# Patient Record
Sex: MALE | Race: WHITE | Employment: FULL TIME | URBAN - NONMETROPOLITAN AREA
[De-identification: names, ages, dates, MRNs, and addresses within clinical notes are randomized per-mention and may not be internally consistent; named-entity substitution may affect disease eponyms.]

---

## 2022-12-15 ENCOUNTER — OFFICE VISIT (OUTPATIENT)
Dept: PRIMARY CARE CLINIC | Age: 42
End: 2022-12-15

## 2022-12-15 VITALS
RESPIRATION RATE: 18 BRPM | WEIGHT: 248 LBS | SYSTOLIC BLOOD PRESSURE: 116 MMHG | BODY MASS INDEX: 37.59 KG/M2 | HEART RATE: 68 BPM | DIASTOLIC BLOOD PRESSURE: 72 MMHG | OXYGEN SATURATION: 98 % | HEIGHT: 68 IN

## 2022-12-15 DIAGNOSIS — M25.859 FEMORAL ACETABULAR IMPINGEMENT: ICD-10-CM

## 2022-12-15 DIAGNOSIS — M51.16 LUMBAR DISC DISEASE WITH RADICULOPATHY: Primary | ICD-10-CM

## 2022-12-15 PROBLEM — B18.2 CHRONIC HEPATITIS C WITHOUT HEPATIC COMA (HCC): Status: ACTIVE | Noted: 2021-05-26

## 2022-12-15 RX ORDER — NALOXONE HYDROCHLORIDE 4 MG/.1ML
4 SPRAY NASAL
COMMUNITY
Start: 2019-01-02

## 2022-12-15 RX ORDER — METHYLPREDNISOLONE ACETATE 40 MG/ML
60 INJECTION, SUSPENSION INTRA-ARTICULAR; INTRALESIONAL; INTRAMUSCULAR; SOFT TISSUE ONCE
Status: COMPLETED | OUTPATIENT
Start: 2022-12-15 | End: 2022-12-15

## 2022-12-15 RX ORDER — SERTRALINE HYDROCHLORIDE 100 MG/1
100 TABLET, FILM COATED ORAL DAILY
COMMUNITY
Start: 2022-11-20

## 2022-12-15 RX ORDER — METHYLPREDNISOLONE ACETATE 40 MG/ML
60 INJECTION, SUSPENSION INTRA-ARTICULAR; INTRALESIONAL; INTRAMUSCULAR; SOFT TISSUE ONCE
Qty: 1.5 ML | Refills: 0
Start: 2022-12-15 | End: 2022-12-15 | Stop reason: CLARIF

## 2022-12-15 RX ORDER — IBUPROFEN 800 MG/1
800 TABLET ORAL EVERY 8 HOURS PRN
Qty: 90 TABLET | Refills: 0 | Status: SHIPPED | OUTPATIENT
Start: 2022-12-15 | End: 2023-01-14

## 2022-12-15 RX ADMIN — METHYLPREDNISOLONE ACETATE 60 MG: 40 INJECTION, SUSPENSION INTRA-ARTICULAR; INTRALESIONAL; INTRAMUSCULAR; SOFT TISSUE at 13:39

## 2022-12-15 ASSESSMENT — ENCOUNTER SYMPTOMS
BACK PAIN: 1
ABDOMINAL PAIN: 0
BOWEL INCONTINENCE: 0

## 2022-12-15 NOTE — LETTER
[address]  Phone: [phone]  Fax: [phone], APRN - CNP        December 15, 2022     Patient: Zygmunt Graft   YOB: 1980   Date of Visit: 12/15/2022       To Whom It May Concern: It is my medical opinion that Ashly Allan may return to work on 12/15/2022. Work should avoid crawling, continuous kneeling positions. work should include a mixture of sitting, standing walking with occasional bending, twisting or stooping. Lifting is not to exceed 45#. these restrictioons are recommended for 7 days. He is being referred to Specialist for additonal evaluation and recommendations. He will follow up with me 1/3/2022. If you have any questions or concerns, please don't hesitate to call.     Sincerely,        Marie Dixon, JAIME - CNP

## 2022-12-15 NOTE — PROGRESS NOTES
[address]  Dept: [phone]  Dept Fax: [phone]  Loc Appt: [phone]  Loc Fax: [phone]    Rudolph Cabral is a 38 y.o. male who presents today for his medical conditions/complaints as noted below. Chief Complaint   Patient presents with    Back Pain     Lower back pain, has been dealing with it for 2 years now. States his job here is making the pain go to a 7 from a resting of 4           HPI:     38year old male with chronic low back and hip pain requests acute appointment for pain and limitations in performing his work role  New employee with [employer], work role is kneeling, squatting, crawling repetitively doing floor work and he reports this is causing increased pain to his low back, causing him to miss work and impairing his daily activities    He has been seeing AT onsite for this problem and conservative treatment is not improving chronic problem/symptoms  Pt is not insured, has 30 days until insurance coverage is in effect with employer    Has no specific pcp, saw Bradley Nation in past year. He has been worked up for this condition in past year with imaging on file at [place] [id number]. He consented to records release for his appointment today. He is asking for help with exposure and some restrictions to allow his back to rest and for treatment options for his chronic condition. Pain > 2 years, recently employed in past 5 weeks and started having worse pain within a few days working in his new job. 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 The current episode started more than 1 year ago. The problem occurs constantly. The problem has been rapidly worsening since onset. The pain is present in the gluteal, lumbar spine and sacro-iliac.  The quality of the pain is described as aching and shooting. The pain radiates to the right thigh. The pain is at a severity of 6/10. The pain is moderate. The pain is The same all the time. The symptoms are aggravated by bending, lying down, position, sitting and twisting. Stiffness is present All day. Associated symptoms include leg pain and paresthesias. Pertinent negatives include no abdominal pain, bladder incontinence, bowel incontinence, chest pain, dysuria, fever, headaches, numbness, paresis, pelvic pain, perianal numbness, tingling, weakness or weight loss. Risk factors include obesity and sedentary lifestyle (history of djd and right hip acetabular impingement). He has tried NSAIDs, walking and home exercises for the symptoms. The treatment provided no relief. Current Outpatient Medications   Medication Sig Dispense Refill    naloxone 4 MG/0.1ML LIQD nasal spray 4 mg by Nasal route once as needed      ibuprofen (ADVIL;MOTRIN) 800 MG tablet Take 1 tablet by mouth every 8 hours as needed for Pain 90 tablet 0    sertraline (ZOLOFT) 100 MG tablet Take 100 mg by mouth daily       No current facility-administered medications for this visit. Allergies   Allergen Reactions    Ketorolac Hives    Naproxen Hives    Tramadol Hives       Subjective:      Review of Systems   Constitutional:  Negative for fever and weight loss. Cardiovascular:  Negative for chest pain. Gastrointestinal:  Negative for abdominal pain and bowel incontinence. Genitourinary:  Negative for bladder incontinence, dysuria and pelvic pain. Musculoskeletal:  Positive for arthralgias, back pain and myalgias. Right hip pain and sciatica     Neurological:  Positive for paresthesias. Negative for tingling, weakness, numbness and headaches.      Objective:     /72 (Site: Left Upper Arm, Position: Sitting, Cuff Size: Large Adult)   Pulse 68   Resp 18   Ht 5' 7.75\" (1.721 m)   Wt 248 lb (112.5 kg)   SpO2 98%   BMI 37.99 kg/m²     Physical Exam  Constitutional:       Appearance: He is obese. He is not ill-appearing or diaphoretic. HENT:      Head: Normocephalic. Cardiovascular:      Rate and Rhythm: Normal rate. Pulses: Normal pulses. Pulmonary:      Effort: Pulmonary effort is normal.   Abdominal:      General: Bowel sounds are normal.   Musculoskeletal:         General: Swelling and tenderness present. No deformity. Lumbar back: Swelling, tenderness and bony tenderness present. No spasms. Normal range of motion. Positive right straight leg raise test.        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 Capillary Refill: Capillary refill takes less than 2 seconds. Findings: No bruising or erythema. Neurological:      Mental Status: He is alert and oriented to person, place, and time. Sensory: No sensory deficit. Motor: Weakness present. Assessment:      1. Lumbar disc disease with radiculopathy with history of   2. Right femoral acetabular impingement    - [facility] - Alpa Pennington MD, Orthopedic Surgery, [place]  - methylPREDNISolone acetate (DEPO-MEDROL) injection 60 mg         Plan:   Work restrictions written with referral to specialist for additional recommendations, treatment and or restrictions  Pt agreeable to poc  Advised to start ADA paperwork with employer for consideration due to absences related to CMD and work exposure. Follow up emergently for any changes to bowel and bladder, loss of sensation to groin, or lower extremities    See additonal AVS recommendations and continue core strengthening and stretches exercises to help with chronic low back and right hip pain. Nsaids trial script for anti inflammatory  Steroid Im today tolerated well, no adverse reaction    Discussed use, benefit, and side effects of prescribed medications. Barriers to medication compliance addressed.   All patient questions answered. Pt voiced understanding. Pt was able to demonstrate full ROM, of waist, torso, B hips and lumbar spine  In the examination he was able to demonstrate his work role with the position requirement with kneeling, squatting and forward reaching and waist flexion performing the [employer]iana job which he states exacerbates his back pain to severity of 10/10 at end of work day  Pt reports he is going home early today due to pain. Return in about 4 weeks (around 1/12/2023).   Orders Placed This Encounter   Procedures    [facility] - Demetria Avila MD, Orthopedic Surgery, [address] [place] [address]     Referral Priority:   Routine     Referral Type:   Eval and Treat     Referral Reason:   Specialty Services Required     Referred to Provider:   Tan George MD     Requested Specialty:   Orthopedic Surgery     Number of Visits Requested:   1     Orders Placed This Encounter   Medications    DISCONTD: methylPREDNISolone acetate (DEPO-MEDROL) 40 MG/ML injection     Sig: Inject 1.5 mLs into the muscle once for 1 dose     Dispense:  1.5 mL     Refill:  0    ibuprofen (ADVIL;MOTRIN) 800 MG tablet     Sig: Take 1 tablet by mouth every 8 hours as needed for Pain     Dispense:  90 tablet     Refill:  0    methylPREDNISolone acetate (DEPO-MEDROL) injection 60 mg           Electronically signed by JAIME Jefferson CNP on 12/15/2022 at 2:06 PM

## 2022-12-15 NOTE — PATIENT INSTRUCTIONS
Follow up with Jacki Badillo to start ADA  Restrictions Temporary   Follow up with Orthopedic provider who I referred you to for additional restrictions or treatment recommended based upon chronic medical condition. Follow up emergently for any changes to bowel and bladder, loss of sensation to groin, or lower extremities  See additional AVS recommendations and continue core strengthening and stretches exercises to help wioth chronic low back and right hip pain.

## 2023-01-03 ENCOUNTER — FOLLOWUP TELEPHONE ENCOUNTER (OUTPATIENT)
Dept: PRIMARY CARE CLINIC | Age: 43
End: 2023-01-03

## 2023-01-16 NOTE — TELEPHONE ENCOUNTER
Pt has not followed up with Orthopedics for diagnostic purposes as recommended for ADA and FMLA requests for [facility] documentation as of 1/3/2023    In December he had presented to [facility] at his work, to have me as the provider cover days of absence related to a reported chronic medical condition that was interfering with work and wanted to initiate an ADA claim. He called his employers [employer]rd [employer] and requested forms to be sent to this office. On 12/15/2022 Pt was advised to see Ortho, referral was made 12/15/2022 for additional evaluation for dx purposes so that his  ADA paperwork could be completed appropriately and accurately. Pt did not seek additional evaluation    Since that time, I received notification today 1/16/2023 that PT is no longer employed. with [employer] so he is not able to be seen in this site further since he is not employed by this company which provides the patient with access to use this clinic. We will scan the blank forms he dropped off for ADA documentation into Epic with his list of multiple dates of work absence  this will be in media    No further follow up from this clinic. He still has active referral pending for Orthopedics to pursue treatment and evaluation for his complaints so that he may seek care that was recommended.